# Patient Record
Sex: FEMALE | Race: WHITE | NOT HISPANIC OR LATINO | Employment: OTHER | URBAN - METROPOLITAN AREA
[De-identification: names, ages, dates, MRNs, and addresses within clinical notes are randomized per-mention and may not be internally consistent; named-entity substitution may affect disease eponyms.]

---

## 2018-05-14 ENCOUNTER — CONVERSION ENCOUNTER (OUTPATIENT)
Dept: OTHER | Facility: HOSPITAL | Age: 58
End: 2018-05-14

## 2018-06-14 ENCOUNTER — OFFICE VISIT CONVERTED (OUTPATIENT)
Dept: OTHER | Facility: HOSPITAL | Age: 58
End: 2018-06-14
Attending: NURSE PRACTITIONER

## 2018-06-14 ENCOUNTER — CONVERSION ENCOUNTER (OUTPATIENT)
Dept: OTHER | Facility: HOSPITAL | Age: 58
End: 2018-06-14

## 2018-08-01 ENCOUNTER — OFFICE VISIT CONVERTED (OUTPATIENT)
Dept: OTHER | Facility: HOSPITAL | Age: 58
End: 2018-08-01
Attending: NURSE PRACTITIONER

## 2018-09-04 ENCOUNTER — OFFICE VISIT CONVERTED (OUTPATIENT)
Dept: OTHER | Facility: HOSPITAL | Age: 58
End: 2018-09-04
Attending: NURSE PRACTITIONER

## 2018-11-01 ENCOUNTER — OFFICE VISIT CONVERTED (OUTPATIENT)
Dept: OTHER | Facility: HOSPITAL | Age: 58
End: 2018-11-01
Attending: NURSE PRACTITIONER

## 2018-11-01 ENCOUNTER — CONVERSION ENCOUNTER (OUTPATIENT)
Dept: OTHER | Facility: HOSPITAL | Age: 58
End: 2018-11-01

## 2019-01-31 ENCOUNTER — OFFICE VISIT CONVERTED (OUTPATIENT)
Dept: OTHER | Facility: HOSPITAL | Age: 59
End: 2019-01-31
Attending: NURSE PRACTITIONER

## 2019-04-30 ENCOUNTER — CONVERSION ENCOUNTER (OUTPATIENT)
Dept: OTHER | Facility: HOSPITAL | Age: 59
End: 2019-04-30

## 2019-04-30 ENCOUNTER — OFFICE VISIT CONVERTED (OUTPATIENT)
Dept: OTHER | Facility: HOSPITAL | Age: 59
End: 2019-04-30
Attending: NURSE PRACTITIONER

## 2019-04-30 ENCOUNTER — HOSPITAL ENCOUNTER (OUTPATIENT)
Dept: OTHER | Facility: HOSPITAL | Age: 59
Discharge: HOME | End: 2019-04-30
Attending: NURSE PRACTITIONER

## 2019-04-30 LAB
ALBUMIN SERPL-MCNC: 3.7 G/DL (ref 3.5–5)
ALBUMIN/GLOB SERPL: 0.9 {RATIO} (ref 1.4–2.6)
ALP SERPL-CCNC: 128 U/L (ref 53–141)
ALT SERPL-CCNC: 16 U/L (ref 10–40)
ANION GAP SERPL CALC-SCNC: 20 MMOL/L (ref 8–19)
AST SERPL-CCNC: 31 U/L (ref 15–50)
BASOPHILS # BLD AUTO: 0.07 10*3/UL (ref 0–0.2)
BASOPHILS NFR BLD AUTO: 0.5 % (ref 0–3)
BILIRUB SERPL-MCNC: 0.29 MG/DL (ref 0.2–1.3)
BUN SERPL-MCNC: 7 MG/DL (ref 5–25)
BUN/CREAT SERPL: 8 {RATIO} (ref 6–20)
CALCIUM SERPL-MCNC: 8.9 MG/DL (ref 8.7–10.4)
CHLORIDE SERPL-SCNC: 98 MMOL/L (ref 99–111)
CONV ABS IMM GRAN: 0.05 10*3/UL (ref 0–0.2)
CONV CO2: 22 MMOL/L (ref 22–32)
CONV IMMATURE GRAN: 0.4 % (ref 0–1.8)
CONV TOTAL PROTEIN: 7.6 G/DL (ref 6.3–8.2)
CREAT UR-MCNC: 0.89 MG/DL (ref 0.5–0.9)
DEPRECATED RDW RBC AUTO: 61.5 FL (ref 36.4–46.3)
EOSINOPHIL # BLD AUTO: 0.57 10*3/UL (ref 0–0.7)
EOSINOPHIL # BLD AUTO: 4.4 % (ref 0–7)
ERYTHROCYTE [DISTWIDTH] IN BLOOD BY AUTOMATED COUNT: 19.1 % (ref 11.7–14.4)
GFR SERPLBLD BASED ON 1.73 SQ M-ARVRAT: >60 ML/MIN/{1.73_M2}
GLOBULIN UR ELPH-MCNC: 3.9 G/DL (ref 2–3.5)
GLUCOSE SERPL-MCNC: 94 MG/DL (ref 65–99)
HBA1C MFR BLD: 14.5 G/DL (ref 12–16)
HCT VFR BLD AUTO: 46.7 % (ref 37–47)
LYMPHOCYTES # BLD AUTO: 3.83 10*3/UL (ref 1–5)
MCH RBC QN AUTO: 27.8 PG (ref 27–31)
MCHC RBC AUTO-ENTMCNC: 31 G/DL (ref 33–37)
MCV RBC AUTO: 89.5 FL (ref 81–99)
MONOCYTES # BLD AUTO: 0.73 10*3/UL (ref 0.2–1.2)
MONOCYTES NFR BLD AUTO: 5.7 % (ref 3–10)
NEUTROPHILS # BLD AUTO: 7.65 10*3/UL (ref 2–8)
NEUTROPHILS NFR BLD AUTO: 59.3 % (ref 30–85)
NRBC CBCN: 0 % (ref 0–0.7)
OSMOLALITY SERPL CALC.SUM OF ELEC: 280 MOSM/KG (ref 273–304)
PLATELET # BLD AUTO: 440 10*3/UL (ref 130–400)
PMV BLD AUTO: 10.7 FL (ref 9.4–12.3)
POTASSIUM SERPL-SCNC: 4.3 MMOL/L (ref 3.5–5.3)
RBC # BLD AUTO: 5.22 10*6/UL (ref 4.2–5.4)
SODIUM SERPL-SCNC: 136 MMOL/L (ref 135–147)
VARIANT LYMPHS NFR BLD MANUAL: 29.7 % (ref 20–45)
WBC # BLD AUTO: 12.9 10*3/UL (ref 4.8–10.8)

## 2019-05-14 ENCOUNTER — CONVERSION ENCOUNTER (OUTPATIENT)
Dept: OTHER | Facility: HOSPITAL | Age: 59
End: 2019-05-14

## 2019-05-14 ENCOUNTER — OFFICE VISIT CONVERTED (OUTPATIENT)
Dept: OTHER | Facility: HOSPITAL | Age: 59
End: 2019-05-14
Attending: NURSE PRACTITIONER

## 2019-07-11 ENCOUNTER — HOSPITAL ENCOUNTER (OUTPATIENT)
Dept: OTHER | Facility: HOSPITAL | Age: 59
Discharge: HOME | End: 2019-07-11
Attending: NURSE PRACTITIONER

## 2019-07-11 ENCOUNTER — OFFICE VISIT CONVERTED (OUTPATIENT)
Dept: OTHER | Facility: HOSPITAL | Age: 59
End: 2019-07-11
Attending: NURSE PRACTITIONER

## 2019-07-11 ENCOUNTER — CONVERSION ENCOUNTER (OUTPATIENT)
Dept: OTHER | Facility: HOSPITAL | Age: 59
End: 2019-07-11

## 2019-07-11 LAB
ALBUMIN SERPL-MCNC: 4 G/DL (ref 3.5–5)
ALBUMIN/GLOB SERPL: 1.1 {RATIO} (ref 1.4–2.6)
ALP SERPL-CCNC: 157 U/L (ref 53–141)
ALT SERPL-CCNC: 15 U/L (ref 10–40)
ANION GAP SERPL CALC-SCNC: 19 MMOL/L (ref 8–19)
AST SERPL-CCNC: 16 U/L (ref 15–50)
BASOPHILS # BLD AUTO: 0.05 10*3/UL (ref 0–0.2)
BASOPHILS NFR BLD AUTO: 0.4 % (ref 0–3)
BILIRUB SERPL-MCNC: 0.34 MG/DL (ref 0.2–1.3)
BUN SERPL-MCNC: 10 MG/DL (ref 5–25)
BUN/CREAT SERPL: 10 {RATIO} (ref 6–20)
CALCIUM SERPL-MCNC: 9.1 MG/DL (ref 8.7–10.4)
CHLORIDE SERPL-SCNC: 93 MMOL/L (ref 99–111)
CHOLEST SERPL-MCNC: 152 MG/DL (ref 107–200)
CHOLEST/HDLC SERPL: 3.6 {RATIO} (ref 3–6)
CONV ABS IMM GRAN: 0.06 10*3/UL (ref 0–0.2)
CONV CO2: 30 MMOL/L (ref 22–32)
CONV IMMATURE GRAN: 0.5 % (ref 0–1.8)
CONV TOTAL PROTEIN: 7.7 G/DL (ref 6.3–8.2)
CREAT UR-MCNC: 0.96 MG/DL (ref 0.5–0.9)
DEPRECATED RDW RBC AUTO: 47.8 FL (ref 36.4–46.3)
EOSINOPHIL # BLD AUTO: 0.23 10*3/UL (ref 0–0.7)
EOSINOPHIL # BLD AUTO: 1.8 % (ref 0–7)
ERYTHROCYTE [DISTWIDTH] IN BLOOD BY AUTOMATED COUNT: 14.8 % (ref 11.7–14.4)
FOLATE SERPL-MCNC: >20 NG/ML (ref 4.8–20)
GFR SERPLBLD BASED ON 1.73 SQ M-ARVRAT: >60 ML/MIN/{1.73_M2}
GLOBULIN UR ELPH-MCNC: 3.7 G/DL (ref 2–3.5)
GLUCOSE SERPL-MCNC: 126 MG/DL (ref 65–99)
HBA1C MFR BLD: 15.1 G/DL (ref 12–16)
HCT VFR BLD AUTO: 47.3 % (ref 37–47)
HDLC SERPL-MCNC: 42 MG/DL (ref 40–60)
LDLC SERPL CALC-MCNC: 80 MG/DL (ref 70–100)
LYMPHOCYTES # BLD AUTO: 3.88 10*3/UL (ref 1–5)
MCH RBC QN AUTO: 28.2 PG (ref 27–31)
MCHC RBC AUTO-ENTMCNC: 31.9 G/DL (ref 33–37)
MCV RBC AUTO: 88.2 FL (ref 81–99)
MONOCYTES # BLD AUTO: 0.57 10*3/UL (ref 0.2–1.2)
MONOCYTES NFR BLD AUTO: 4.4 % (ref 3–10)
NEUTROPHILS # BLD AUTO: 8.21 10*3/UL (ref 2–8)
NEUTROPHILS NFR BLD AUTO: 63.1 % (ref 30–85)
NRBC CBCN: 0 % (ref 0–0.7)
OSMOLALITY SERPL CALC.SUM OF ELEC: 287 MOSM/KG (ref 273–304)
PLATELET # BLD AUTO: 339 10*3/UL (ref 130–400)
PMV BLD AUTO: 10.8 FL (ref 9.4–12.3)
POTASSIUM SERPL-SCNC: 3.5 MMOL/L (ref 3.5–5.3)
RBC # BLD AUTO: 5.36 10*6/UL (ref 4.2–5.4)
SODIUM SERPL-SCNC: 138 MMOL/L (ref 135–147)
T4 FREE SERPL-MCNC: 1.4 NG/DL (ref 0.9–1.8)
TRIGL SERPL-MCNC: 151 MG/DL (ref 40–150)
TSH SERPL-ACNC: 0.25 M[IU]/L (ref 0.27–4.2)
VARIANT LYMPHS NFR BLD MANUAL: 29.8 % (ref 20–45)
VIT B12 SERPL-MCNC: 160 PG/ML (ref 211–911)
VLDLC SERPL-MCNC: 30 MG/DL (ref 5–37)
WBC # BLD AUTO: 13 10*3/UL (ref 4.8–10.8)

## 2019-10-14 ENCOUNTER — CONVERSION ENCOUNTER (OUTPATIENT)
Dept: OTHER | Facility: HOSPITAL | Age: 59
End: 2019-10-14

## 2019-10-14 ENCOUNTER — HOSPITAL ENCOUNTER (OUTPATIENT)
Dept: OTHER | Facility: HOSPITAL | Age: 59
Discharge: HOME | End: 2019-10-14
Attending: NURSE PRACTITIONER

## 2019-10-14 ENCOUNTER — OFFICE VISIT CONVERTED (OUTPATIENT)
Dept: OTHER | Facility: HOSPITAL | Age: 59
End: 2019-10-14
Attending: NURSE PRACTITIONER

## 2019-10-14 LAB
ALBUMIN SERPL-MCNC: 3.4 G/DL (ref 3.5–5)
ALBUMIN/GLOB SERPL: 1.1 {RATIO} (ref 1.4–2.6)
ALP SERPL-CCNC: 119 U/L (ref 43–160)
ALT SERPL-CCNC: <5 U/L (ref 10–40)
ANION GAP SERPL CALC-SCNC: 20 MMOL/L (ref 8–19)
AST SERPL-CCNC: 10 U/L (ref 15–50)
BILIRUB SERPL-MCNC: 0.32 MG/DL (ref 0.2–1.3)
BUN SERPL-MCNC: 9 MG/DL (ref 5–25)
BUN/CREAT SERPL: 11 {RATIO} (ref 6–20)
CALCIUM SERPL-MCNC: 9.7 MG/DL (ref 8.7–10.4)
CHLORIDE SERPL-SCNC: 101 MMOL/L (ref 99–111)
CONV CO2: 22 MMOL/L (ref 22–32)
CONV TOTAL PROTEIN: 6.5 G/DL (ref 6.3–8.2)
CREAT UR-MCNC: 0.84 MG/DL (ref 0.5–0.9)
GFR SERPLBLD BASED ON 1.73 SQ M-ARVRAT: >60 ML/MIN/{1.73_M2}
GLOBULIN UR ELPH-MCNC: 3.1 G/DL (ref 2–3.5)
GLUCOSE SERPL-MCNC: 90 MG/DL (ref 65–99)
MAGNESIUM SERPL-MCNC: 2.07 MG/DL (ref 1.6–2.3)
OSMOLALITY SERPL CALC.SUM OF ELEC: 286 MOSM/KG (ref 273–304)
POTASSIUM SERPL-SCNC: 4.3 MMOL/L (ref 3.5–5.3)
SODIUM SERPL-SCNC: 139 MMOL/L (ref 135–147)

## 2019-11-26 ENCOUNTER — OFFICE VISIT CONVERTED (OUTPATIENT)
Dept: OTHER | Facility: HOSPITAL | Age: 59
End: 2019-11-26
Attending: NURSE PRACTITIONER

## 2019-11-26 ENCOUNTER — CONVERSION ENCOUNTER (OUTPATIENT)
Dept: OTHER | Facility: HOSPITAL | Age: 59
End: 2019-11-26

## 2020-01-09 ENCOUNTER — CONVERSION ENCOUNTER (OUTPATIENT)
Dept: OTHER | Facility: HOSPITAL | Age: 60
End: 2020-01-09

## 2020-01-09 ENCOUNTER — OFFICE VISIT CONVERTED (OUTPATIENT)
Dept: OTHER | Facility: HOSPITAL | Age: 60
End: 2020-01-09
Attending: NURSE PRACTITIONER

## 2020-01-09 ENCOUNTER — HOSPITAL ENCOUNTER (OUTPATIENT)
Dept: OTHER | Facility: HOSPITAL | Age: 60
Discharge: HOME | End: 2020-01-09
Attending: NURSE PRACTITIONER

## 2020-01-09 LAB
ALBUMIN SERPL-MCNC: 3.5 G/DL (ref 3.5–5)
ALBUMIN/GLOB SERPL: 1.1 {RATIO} (ref 1.4–2.6)
ALP SERPL-CCNC: 125 U/L (ref 43–160)
ALT SERPL-CCNC: 10 U/L (ref 10–40)
ANION GAP SERPL CALC-SCNC: 18 MMOL/L (ref 8–19)
AST SERPL-CCNC: 18 U/L (ref 15–50)
BASOPHILS # BLD AUTO: 0.05 10*3/UL (ref 0–0.2)
BASOPHILS NFR BLD AUTO: 0.5 % (ref 0–3)
BILIRUB SERPL-MCNC: 0.23 MG/DL (ref 0.2–1.3)
BUN SERPL-MCNC: 12 MG/DL (ref 5–25)
BUN/CREAT SERPL: 14 {RATIO} (ref 6–20)
CALCIUM SERPL-MCNC: 9.2 MG/DL (ref 8.7–10.4)
CHLORIDE SERPL-SCNC: 104 MMOL/L (ref 99–111)
CHOLEST SERPL-MCNC: 175 MG/DL (ref 107–200)
CHOLEST/HDLC SERPL: 5.3 {RATIO} (ref 3–6)
CONV ABS IMM GRAN: 0.03 10*3/UL (ref 0–0.2)
CONV CO2: 21 MMOL/L (ref 22–32)
CONV IMMATURE GRAN: 0.3 % (ref 0–1.8)
CONV TOTAL PROTEIN: 6.6 G/DL (ref 6.3–8.2)
CREAT UR-MCNC: 0.84 MG/DL (ref 0.5–0.9)
DEPRECATED RDW RBC AUTO: 52.9 FL (ref 36.4–46.3)
EOSINOPHIL # BLD AUTO: 0.37 10*3/UL (ref 0–0.7)
EOSINOPHIL # BLD AUTO: 3.6 % (ref 0–7)
ERYTHROCYTE [DISTWIDTH] IN BLOOD BY AUTOMATED COUNT: 15.8 % (ref 11.7–14.4)
GFR SERPLBLD BASED ON 1.73 SQ M-ARVRAT: >60 ML/MIN/{1.73_M2}
GLOBULIN UR ELPH-MCNC: 3.1 G/DL (ref 2–3.5)
GLUCOSE SERPL-MCNC: 94 MG/DL (ref 65–99)
HCT VFR BLD AUTO: 44.5 % (ref 37–47)
HDLC SERPL-MCNC: 33 MG/DL (ref 40–60)
HGB BLD-MCNC: 13.6 G/DL (ref 12–16)
LDLC SERPL CALC-MCNC: 109 MG/DL (ref 70–100)
LYMPHOCYTES # BLD AUTO: 2.9 10*3/UL (ref 1–5)
LYMPHOCYTES NFR BLD AUTO: 27.9 % (ref 20–45)
MCH RBC QN AUTO: 28 PG (ref 27–31)
MCHC RBC AUTO-ENTMCNC: 30.6 G/DL (ref 33–37)
MCV RBC AUTO: 91.8 FL (ref 81–99)
MONOCYTES # BLD AUTO: 0.64 10*3/UL (ref 0.2–1.2)
MONOCYTES NFR BLD AUTO: 6.1 % (ref 3–10)
NEUTROPHILS # BLD AUTO: 6.42 10*3/UL (ref 2–8)
NEUTROPHILS NFR BLD AUTO: 61.6 % (ref 30–85)
NRBC CBCN: 0 % (ref 0–0.7)
OSMOLALITY SERPL CALC.SUM OF ELEC: 286 MOSM/KG (ref 273–304)
PLATELET # BLD AUTO: 321 10*3/UL (ref 130–400)
PMV BLD AUTO: 10.8 FL (ref 9.4–12.3)
POTASSIUM SERPL-SCNC: 4.8 MMOL/L (ref 3.5–5.3)
RBC # BLD AUTO: 4.85 10*6/UL (ref 4.2–5.4)
SODIUM SERPL-SCNC: 138 MMOL/L (ref 135–147)
TRIGL SERPL-MCNC: 167 MG/DL (ref 40–150)
TSH SERPL-ACNC: 1.81 M[IU]/L (ref 0.27–4.2)
VLDLC SERPL-MCNC: 33 MG/DL (ref 5–37)
WBC # BLD AUTO: 10.41 10*3/UL (ref 4.8–10.8)

## 2020-01-21 ENCOUNTER — OFFICE VISIT CONVERTED (OUTPATIENT)
Dept: OTHER | Facility: HOSPITAL | Age: 60
End: 2020-01-21
Attending: NURSE PRACTITIONER

## 2020-01-21 ENCOUNTER — CONVERSION ENCOUNTER (OUTPATIENT)
Dept: OTHER | Facility: HOSPITAL | Age: 60
End: 2020-01-21

## 2020-02-10 ENCOUNTER — OFFICE VISIT CONVERTED (OUTPATIENT)
Dept: OTHER | Facility: HOSPITAL | Age: 60
End: 2020-02-10
Attending: NURSE PRACTITIONER

## 2020-02-10 ENCOUNTER — CONVERSION ENCOUNTER (OUTPATIENT)
Dept: OTHER | Facility: HOSPITAL | Age: 60
End: 2020-02-10

## 2020-03-12 ENCOUNTER — OFFICE VISIT CONVERTED (OUTPATIENT)
Dept: OTHER | Facility: HOSPITAL | Age: 60
End: 2020-03-12
Attending: NURSE PRACTITIONER

## 2020-03-12 ENCOUNTER — CONVERSION ENCOUNTER (OUTPATIENT)
Dept: OTHER | Facility: HOSPITAL | Age: 60
End: 2020-03-12

## 2020-04-22 ENCOUNTER — TELEMEDICINE CONVERTED (OUTPATIENT)
Dept: OTHER | Facility: HOSPITAL | Age: 60
End: 2020-04-22
Attending: NURSE PRACTITIONER

## 2020-05-07 ENCOUNTER — TELEMEDICINE CONVERTED (OUTPATIENT)
Dept: OTHER | Facility: HOSPITAL | Age: 60
End: 2020-05-07
Attending: NURSE PRACTITIONER

## 2020-06-29 ENCOUNTER — CONVERSION ENCOUNTER (OUTPATIENT)
Dept: OTHER | Facility: HOSPITAL | Age: 60
End: 2020-06-29

## 2020-06-29 ENCOUNTER — HOSPITAL ENCOUNTER (OUTPATIENT)
Dept: OTHER | Facility: HOSPITAL | Age: 60
Discharge: HOME | End: 2020-06-29
Attending: NURSE PRACTITIONER

## 2020-06-29 ENCOUNTER — OFFICE VISIT CONVERTED (OUTPATIENT)
Dept: OTHER | Facility: HOSPITAL | Age: 60
End: 2020-06-29
Attending: NURSE PRACTITIONER

## 2020-06-30 LAB
ALBUMIN SERPL-MCNC: 4 G/DL (ref 3.5–5)
ALBUMIN/GLOB SERPL: 1.3 {RATIO} (ref 1.4–2.6)
ALP SERPL-CCNC: 5 U/L (ref 43–160)
ALT SERPL-CCNC: 13 U/L (ref 10–40)
ANION GAP SERPL CALC-SCNC: 18 MMOL/L (ref 8–19)
AST SERPL-CCNC: 21 U/L (ref 15–50)
BASOPHILS # BLD AUTO: 0.04 10*3/UL (ref 0–0.2)
BASOPHILS NFR BLD AUTO: 0.3 % (ref 0–3)
BILIRUB SERPL-MCNC: 0.21 MG/DL (ref 0.2–1.3)
BUN SERPL-MCNC: 13 MG/DL (ref 5–25)
BUN/CREAT SERPL: 16 {RATIO} (ref 6–20)
CALCIUM SERPL-MCNC: 9.2 MG/DL (ref 8.7–10.4)
CHLORIDE SERPL-SCNC: 102 MMOL/L (ref 99–111)
CHOLEST SERPL-MCNC: 198 MG/DL (ref 107–200)
CHOLEST/HDLC SERPL: 5.1 {RATIO} (ref 3–6)
CONV ABS IMM GRAN: 0.08 10*3/UL (ref 0–0.2)
CONV CO2: 24 MMOL/L (ref 22–32)
CONV IMMATURE GRAN: 0.5 % (ref 0–1.8)
CONV TOTAL PROTEIN: 7.2 G/DL (ref 6.3–8.2)
CREAT UR-MCNC: 0.81 MG/DL (ref 0.5–0.9)
DEPRECATED RDW RBC AUTO: 53.1 FL (ref 36.4–46.3)
DIGOXIN SERPL-MCNC: 0.8 NG/ML (ref 0.5–2)
EOSINOPHIL # BLD AUTO: 0.41 10*3/UL (ref 0–0.7)
EOSINOPHIL # BLD AUTO: 2.8 % (ref 0–7)
ERYTHROCYTE [DISTWIDTH] IN BLOOD BY AUTOMATED COUNT: 14.9 % (ref 11.7–14.4)
GFR SERPLBLD BASED ON 1.73 SQ M-ARVRAT: >60 ML/MIN/{1.73_M2}
GLOBULIN UR ELPH-MCNC: 3.2 G/DL (ref 2–3.5)
GLUCOSE SERPL-MCNC: 133 MG/DL (ref 65–99)
HCT VFR BLD AUTO: 50 % (ref 37–47)
HDLC SERPL-MCNC: 39 MG/DL (ref 40–60)
HGB BLD-MCNC: 15.2 G/DL (ref 12–16)
LDLC SERPL CALC-MCNC: 123 MG/DL (ref 70–100)
LYMPHOCYTES # BLD AUTO: 4.84 10*3/UL (ref 1–5)
LYMPHOCYTES NFR BLD AUTO: 33 % (ref 20–45)
MCH RBC QN AUTO: 29.1 PG (ref 27–31)
MCHC RBC AUTO-ENTMCNC: 30.4 G/DL (ref 33–37)
MCV RBC AUTO: 95.8 FL (ref 81–99)
MONOCYTES # BLD AUTO: 0.7 10*3/UL (ref 0.2–1.2)
MONOCYTES NFR BLD AUTO: 4.8 % (ref 3–10)
NEUTROPHILS # BLD AUTO: 8.59 10*3/UL (ref 2–8)
NEUTROPHILS NFR BLD AUTO: 58.6 % (ref 30–85)
NRBC CBCN: 0 % (ref 0–0.7)
OSMOLALITY SERPL CALC.SUM OF ELEC: 290 MOSM/KG (ref 273–304)
PLATELET # BLD AUTO: 293 10*3/UL (ref 130–400)
PMV BLD AUTO: 10.6 FL (ref 9.4–12.3)
POTASSIUM SERPL-SCNC: 4.8 MMOL/L (ref 3.5–5.3)
RBC # BLD AUTO: 5.22 10*6/UL (ref 4.2–5.4)
SODIUM SERPL-SCNC: 139 MMOL/L (ref 135–147)
T4 FREE SERPL-MCNC: 1.1 NG/DL (ref 0.9–1.8)
THEOPHYLLINE SERPL-MCNC: 9.6 UG/ML (ref 10–20)
TRIGL SERPL-MCNC: 179 MG/DL (ref 40–150)
TSH SERPL-ACNC: 2.28 M[IU]/L (ref 0.27–4.2)
VLDLC SERPL-MCNC: 36 MG/DL (ref 5–37)
WBC # BLD AUTO: 14.66 10*3/UL (ref 4.8–10.8)

## 2020-07-01 LAB
CONV HEPATITIS C AB WITH REFLEX TO CONFIRMATION: <0.1 S/CO RATIO (ref 0–0.9)
CONV HEPATITIS COMMENT: NORMAL
EST. AVERAGE GLUCOSE BLD GHB EST-MCNC: 123 MG/DL
HBA1C MFR BLD: 5.9 % (ref 3.5–5.7)

## 2021-05-12 NOTE — PROGRESS NOTES
Progress Note      Patient Name: Valerie Valente   Patient ID: [id number]   Sex: Female   YOB: 1960    Primary Care Provider: Moriah BALDWIN   Referring Provider: Moriah BALDWIN    Visit Date: April 22, 2020    Provider: DENNY Gonzalez   Location: [facility]   Location Address: [address] [place] [address]  [id number]   Location Phone: [phone]          Chief Complaint  · Cough   · Congestion   · Runny Nose   · Body aches   · Chills  · Swelling and joint pain      History Of Present Illness  Valerie Valente is a 59 year old /White female who presents for evaluation and treatment of:   Video Conferencing Visit  Valerie Valente is a 59 year old /White female who is presenting for evaluation via video conferencing. Verbal consent obtained before beginning visit.   The following staff were present during this visit: Stefany Yoder CMA, Moriah BALDWIN      Patient with complaint of cough, congestion, runny nose does admit to body aches and chills denies fever states she does not have a thermometer to check.  Also with complaints of swelling and joint pain. Patient states the symptoms have been ongoing for the last week, she denies any shortness of breath.  She has not taken any over-the-counter medicines she has continued to take her routine albuterol and Flovent.           Past Medical History  Disease Name Date Onset Notes   Altered mental status --  --    Anxiety disorder --  --    CHF (congestive heart failure) --  --    Chronic low back pain --  --    CKD (chronic kidney disease) --  --    COPD (chronic obstructive pulmonary disease) 09/04/2018 --    Depression --  --    Hip pain, bilateral --  --    Hypotension --  --    Hypoxia --  --    Leukocytosis --  --    Lumbar degenerative disc disease --  --    PAF (paroxysmal atrial fibrillation) --  --    Peripheral neuropathy --  --    Pneumonia --  --    Sepsis due to Escherichia coli --   --    Thyroiditis --  --    TIA (transient ischemic attack) --  --          Past Surgical History  Procedure Name Date Notes   Cholecstectomy --  --    Exploratory --  --    Tonsilectomy --  --    Tubal ligation --  --          Medication List  Name Date Started Instructions   albuterol sulfate 0.63 mg/3 mL inhalation solution for nebulization  use in nebulizer as directed   cetirizine 10 mg oral tablet 11/26/2019 take 1 tablet (10 mg) by oral route once daily for 30 days   cyanocobalamin (vitamin B-12) 1,000 mcg/mL injection solution 02/10/2020 inject 1 milliliter (1,000 mcg) by intramuscular route once a month for 30 days   digoxin 125 mcg (0.125 mg) oral tablet 02/10/2020 Take 1 tablet by mouth once daily for 90 days   Flovent  mcg/actuation inhalation HFA aerosol inhaler 01/09/2020 inhale 2 puffs (220 mcg) by inhalation route 2 times per day for 30 days   fluoxetine 20 mg oral capsule 01/07/2020 Take 3 capsules by mouth every morning   folic acid 1 mg oral tablet 01/09/2020 Take 1 tablet by mouth once daily   gabapentin 600 mg oral tablet  take 1 tablet (600 mg) by oral route 3 times per day   hydrocodone-acetaminophen 5-325 mg oral tablet  take 1 tablet by oral route every 6 hours as needed for pain   Imodium A-D 2 mg oral tablet 08/15/2019 1 q 6hrs prin   isosorbide mononitrate 30 mg oral tablet extended release 24 hr 10/10/2019 take 1 tablet (30 mg) by oral route once daily in the morning for 30 days   Lasix 40 mg oral tablet 01/09/2020 Take 1 tablet by mouth every day   levothyroxine 75 mcg oral tablet 01/09/2020 Take 1 tablet by mouth once daily   Mag 64 64 mg oral tablet,delayed release (DR/EC) 01/09/2020 Take 1 tablet by mouth once daily   midodrine 10 mg oral tablet 01/09/2020 Take 1 tablet by mouth three times a day to control drops in blood pressure   nitroglycerin 0.4 mg sublingual tablet, sublingual 01/09/2020 place 1 tablet at the first sign of an attack; no more than 3 tabs are recommended  within a 15 minute period.   omeprazole 20 mg oral capsule,delayed release(DR/EC)  take 1 capsule (20 mg) by oral route once daily before a meal   potassium chloride 20 mEq oral tablet extended release 01/09/2020 take 1 tablet (20 meq) by oral route once daily with food for 30 days   pravastatin 80 mg oral tablet 01/09/2020 take 1 tablet (80 mg) by oral route once daily at bedtime for 90 days   ProAir HFA 90 mcg/actuation inhalation HFA aerosol inhaler 01/09/2020 inhale 1 puff (90 mcg) by inhalation route every 6 hours as needed for 30 days   Singulair 10 mg oral tablet 02/10/2020 take 1 tablet (10 mg) by oral route once daily in the evening for 30 days   theophylline 300 mg oral tablet extended release 12 hr 01/09/2020 Take 1 tablet by mouth three times a day   Xanax 1 mg oral tablet 04/13/2020 take 1 tablet (1 mg) by oral route 3 times per day for 30 days   Zofran 8 mg oral tablet 01/09/2020 take 1 tablet by oral route 3 times a day for 15 days         Allergy List  Allergen Name Date Reaction Notes   Ceclor 2- rash --    Cipro --  nausea and yeast infection --    I.V. Dye 8- --  Had CT of Head   Keflex --  --  --    Terramycin --  red, swelling --        Allergies Reconciled  Family Medical History  Disease Name Relative/Age Notes   Respiratory Disorder  Respiratory failure   Heart Disease  --    Lung cancer  --          Social History  Finding Status Start/Stop Quantity Notes   Alcohol Never --/-- --  --    Denies illicit substance abuse --  --/-- --  --    Denies substance abuse --  --/-- --  --    Tobacco Current every day --/-- 9-12 cigs qd --          Immunizations  NameDate Admin Mfg Trade Name Lot Number Route Inj VIS Given VIS Publication   Dnwqxcbrh00/14/2019 PMC Fluzone Quadrivalent [id number] IM LD 10/14/2019    Comments:    Kykujcwtc14/01/2018 SKB Fluzone Quadrivalent [id number] IM LD 11/01/2018 08/07/2015   Comments:    Cltauytxh59/01/2017 UNK Unknown TradeName  IM UKN     Comments:     Prevnar 1311/02/2015 UNK Unknown TradeName  IM UKN     Comments:          Review of Systems  · Constitutional  o Admits  o : chills, fatigue, weakness  o Denies  o : fever  · HENT  o Admits  o : nasal congestion, postnasal drip  o Denies  o : sinus pain, neck pain, tinnitus, ear pain, ear fullness  · Cardiovascular  o Denies  o : lower extremity edema, pain in chest, palpitations  · Respiratory  o Admits  o : cough, sputum  o Denies  o : shortness of breath  · Gastrointestinal  o Denies  o : diarrhea, constipation  · Musculoskeletal  o Admits  o : arthralgias , myalgias, muscle weakness      Physical Examination  · Constitutional  o Appearance  o : well-nourished, well developed, alert, in no acute distress, well-tended appearance  · Respiratory  o Respiratory Effort  o : breathing comfortably, symmetric chest rise  · Neurologic  o Mental Status Examination  o :   § Orientation  § : grossly oriented to person, place and time  · Psychiatric  o General  o : normal mood and affect          Assessment  · COPD (chronic obstructive pulmonary disease)     496/J44.9  · Cough     786.2/R05  · Upper respiratory infection     465.9/J06.9    Problems Reconciled  Plan  · Orders  o ACO-39: Current medications updated and reviewed () - - 04/22/2020  o ACO-15: Pneumococcal Vaccine Administered or Previously Received (4040F) - - 04/22/2020  o ACO-14: Influenza immunization administered or previously received () - - 04/22/2020  · Medications  o amoxicillin 500 mg oral capsule   SIG: take 1 capsule (500 mg) by oral route every 12 hours for 7 days   DISP: (14) capsules with 0 refills  Prescribed on 04/22/2020     · Instructions  o Take all medications as prescribed/directed.  o Rest. Increase Fluids.  o Patient was educated/instructed on their diagnosis, treatment and medications prior to discharge from the clinic today.  o Patient instructed to seek medical attention urgently for new or worsening symptoms.  o Call the  office with any concerns or questions.  o Discussed Covid-19 precautions including, but not limited to, social distancing, avoid touching your face, and hand washing.   o Advised patient that she needs to monitor her symptoms, she needs to get a thermometer if she does not find when she can stop by the office and we will try and get her 1. If her symptoms worsen or they do not improve she needs to follow-up in the office or in the ER. We will send her in amoxicillin twice daily for 7 days, advised patient not to start it unless she starts to feel worse that this is likely a viral illness but if it continues she may need to start the amoxicillin. Patient and daughter voiced understanding.  · Disposition  o Follow Up PRN            Electronically Signed by: DENNY Gonzalez -Author on April 22, 2020 12:12:35 PM

## 2021-05-13 NOTE — PROGRESS NOTES
Progress Note      Patient Name: Valerie Valente   Patient ID: [id number]   Sex: Female   YOB: 1960    Primary Care Provider: Moriah BALDWIN   Referring Provider: Moriah BALDWIN    Visit Date: May 7, 2020    Provider: DENNY Gonzalez   Location: [facility]   Location Address: [address] [place] [address]  [id number]   Location Phone: [phone]          Chief Complaint  · Follow up   · Allergy Complaints      History Of Present Illness  Valerie Valente is a 59 year old /White female who presents for evaluation and treatment of:   Video Conferencing Visit  Valerie Valente is a 59 year old /White female who is presenting for evaluation via video conferencing. Verbal consent obtained before beginning visit.   The following staff were present during this visit: Stefany Yoder CMA, Moriah BALDWIN      Follow-up anxiety, COPD, and allergic rhinitis.  Patient is currently on alprazolam as needed for anxiety tolerates medication well and denies any side effects.  We have tried to wean her down on this in the past and it did not work out great she became short of breath tachycardic.  Nathaniel and urine drug screen are both up-to-date.    Patient was treated at last visit on April 22 for upper respiratory infection COPD exacerbation she was treated with amoxicillin and told to continue her routine medications. Continues to complain of itchy watery eyes. Some head congestion and chest congestion, with a cough. Overall feeling better. She is on routine Zyrtec and Singulair.       Past Medical History  Disease Name Date Onset Notes   Altered mental status --  --    Anxiety disorder --  --    CHF (congestive heart failure) --  --    Chronic low back pain --  --    CKD (chronic kidney disease) --  --    COPD (chronic obstructive pulmonary disease) 09/04/2018 --    Depression --  --    Hip pain, bilateral --  --    Hypotension --  --    Hypoxia --  --     Leukocytosis --  --    Lumbar degenerative disc disease --  --    PAF (paroxysmal atrial fibrillation) --  --    Peripheral neuropathy --  --    Pneumonia --  --    Sepsis due to Escherichia coli --  --    Thyroiditis --  --    TIA (transient ischemic attack) --  --          Past Surgical History  Procedure Name Date Notes   Cholecstectomy --  --    Exploratory --  --    Tonsilectomy --  --    Tubal ligation --  --          Medication List  Name Date Started Instructions   albuterol sulfate 0.63 mg/3 mL inhalation solution for nebulization  use in nebulizer as directed   cetirizine 10 mg oral tablet 11/26/2019 take 1 tablet (10 mg) by oral route once daily for 30 days   cyanocobalamin (vitamin B-12) 1,000 mcg/mL injection solution 02/10/2020 inject 1 milliliter (1,000 mcg) by intramuscular route once a month for 30 days   digoxin 125 mcg (0.125 mg) oral tablet 02/10/2020 Take 1 tablet by mouth once daily for 90 days   Flovent  mcg/actuation inhalation HFA aerosol inhaler 01/09/2020 inhale 2 puffs (220 mcg) by inhalation route 2 times per day for 30 days   fluoxetine 20 mg oral capsule 01/07/2020 Take 3 capsules by mouth every morning   folic acid 1 mg oral tablet 01/09/2020 Take 1 tablet by mouth once daily   gabapentin 600 mg oral tablet  take 1 tablet (600 mg) by oral route 3 times per day   hydrocodone-acetaminophen 5-325 mg oral tablet  take 1 tablet by oral route every 6 hours as needed for pain   Imodium A-D 2 mg oral tablet 08/15/2019 1 q 6hrs prin   isosorbide mononitrate 30 mg oral tablet extended release 24 hr 10/10/2019 take 1 tablet (30 mg) by oral route once daily in the morning for 30 days   Lasix 40 mg oral tablet 01/09/2020 Take 1 tablet by mouth every day   levothyroxine 75 mcg oral tablet 01/09/2020 Take 1 tablet by mouth once daily   Mag 64 64 mg oral tablet,delayed release (DR/EC) 01/09/2020 Take 1 tablet by mouth once daily   midodrine 10 mg oral tablet 01/09/2020 Take 1 tablet by mouth  three times a day to control drops in blood pressure   nitroglycerin 0.4 mg sublingual tablet, sublingual 01/09/2020 place 1 tablet at the first sign of an attack; no more than 3 tabs are recommended within a 15 minute period.   omeprazole 20 mg oral capsule,delayed release(DR/EC)  take 1 capsule (20 mg) by oral route once daily before a meal   potassium chloride 20 mEq oral tablet extended release 01/09/2020 take 1 tablet (20 meq) by oral route once daily with food for 30 days   pravastatin 80 mg oral tablet 01/09/2020 take 1 tablet (80 mg) by oral route once daily at bedtime for 90 days   ProAir HFA 90 mcg/actuation inhalation HFA aerosol inhaler 01/09/2020 inhale 1 puff (90 mcg) by inhalation route every 6 hours as needed for 30 days   Singulair 10 mg oral tablet 02/10/2020 take 1 tablet (10 mg) by oral route once daily in the evening for 30 days   theophylline 300 mg oral tablet extended release 12 hr 01/09/2020 Take 1 tablet by mouth three times a day   Xanax 1 mg oral tablet 05/07/2020 take 1 tablet (1 mg) by oral route 3 times per day for 30 days   Zofran 8 mg oral tablet 01/09/2020 take 1 tablet by oral route 3 times a day for 15 days         Allergy List  Allergen Name Date Reaction Notes   Christy 2- rash --    Cipro --  nausea and yeast infection --    I.V. Dye 8- --  Had CT of Head   Keflex --  --  --    Terramycin --  red, swelling --        Allergies Reconciled  Family Medical History  Disease Name Relative/Age Notes   Respiratory Disorder  Respiratory failure   Heart Disease  --    Lung cancer  --          Social History  Finding Status Start/Stop Quantity Notes   Alcohol Never --/-- --  --    Denies illicit substance abuse --  --/-- --  --    Denies substance abuse --  --/-- --  --    Tobacco Current every day --/-- 9-12 cigs qd --          Immunizations  NameDate Admin Mfg Trade Name Lot Number Route Inj VIS Given VIS Publication   Pkyrihjyp42/14/2019 [facility] Fluzone Quadrivalent [id number]  IM LD 10/14/2019    Comments:    Xgngwgqms45/01/2018 SKB Fluzone Quadrivalent [id number] IM LD 11/01/2018 08/07/2015   Comments:    Qpvtyontl62/01/2017 UNK Unknown TradeName  IM UKN     Comments:    Prevnar 1311/02/2015 UNK Unknown TradeName  IM UKN     Comments:          Review of Systems  · Constitutional  o Denies  o : fever, chills, fatigue, weakness  · HENT  o Denies  o : sore throat  · Cardiovascular  o Denies  o : lower extremity edema, pain in neck , palpitations  · Respiratory  o Admits  o : shortness of breath, cough  · Gastrointestinal  o Denies  o : diarrhea, constipation  · Genitourinary  o Denies  o : urgency, frequency  · Allergic-Immunologic  o Admits  o : sinus allergy symptoms      Physical Examination  · Constitutional  o Appearance  o : well-nourished, well developed, alert, in no acute distress, well-tended appearance  · Respiratory  o Respiratory Effort  o : breathing comfortably  · Neurologic  o Mental Status Examination  o :   § Orientation  § : grossly oriented to person, place and time  · Psychiatric  o General  o : normal mood and affect          Assessment  · Allergic rhinitis due to allergen     477.9/J30.9  · Anxiety disorder     300.00/F41.9  · COPD (chronic obstructive pulmonary disease)     496/J44.9    Problems Reconciled  Plan  · Orders  o OPHELIA Report (KASPR) - 300.00/F41.9 - 05/07/2020  o ACO-39: Current medications updated and reviewed () - - 05/07/2020  o ACO-14: Influenza immunization administered or previously received () - - 05/07/2020  · Medications  o fluticasone propionate 50 mcg/actuation nasal spray,suspension   SIG: spray 1 - 2 sprays in each nostril by intranasal route once daily   DISP: (1) 9.9 ml aer w/adap with 2 refills  Prescribed on 05/07/2020     o Xanax 1 mg oral tablet   SIG: take 1 tablet (1 mg) by oral route 3 times per day for 30 days   DISP: (90) tablet with 0 refills  Refilled on 05/07/2020     o amoxicillin 500 mg oral capsule   SIG: take 1  capsule (500 mg) by oral route every 12 hours for 7 days   DISP: (14) capsules with 0 refills  Discontinued on 05/07/2020     · Instructions  o Patient is taking medications as prescribed and doing well.   o Patient was educated/instructed on their diagnosis, treatment and medications prior to discharge from the clinic today.  o Patient instructed to seek medical attention urgently for new or worsening symptoms.  o Call the office with any concerns or questions.  o Discussed Covid-19 precautions including, but not limited to, social distancing, avoid touching your face, and hand washing.   o We will add Flonase to the patient's allergy regimen, if symptoms do not improve or she worsens encouraged to follow-up in the office and we will consider an allergy referral. Patient voiced understanding.  o Controlled substance agreement has been signed. Urine drug screen and Nathaniel reports have been reviewed and there are no discrepancies. Patient has denied side effect of medications. Patient advised to keep medications in original prescription bottle. Patient is to keep medications in a locked area away from children. Advised not to drive or operate heavy machinery when taking medications.  · Disposition  o Follow Up PRN     We will keep Valerie scheduled appointment for Laura 15, if her symptoms worsen she will become increasingly short of breath fever chills advised her to follow-up in emergency department patient voiced understanding and all questions answered.    EMR dragon/transcription disclaimer: Much of this encounter note is an electronic transcription/translation of spoken language to printed text.  Electronic translation of spoken language may permit erroneous, or at times nonsensical words or phrases to be inadvertently transcribed; although I have reviewed the note for such errors, some may still exist.             Electronically Signed by: Moriah Mendez APRN -Author on May 7, 2020 11:32:26 AM

## 2021-05-13 NOTE — PROGRESS NOTES
Progress Note      Patient Name: Valerie Valente   Patient ID: [id number]   Sex: Female   YOB: 1960    Primary Care Provider: Moriah BALDWIN   Referring Provider: Moriah BALDWIN    Visit Date: June 29, 2020    Provider: DENNY Gonzalez   Location: [facility]   Location Address: [address] [place] [address]  [id number]   Location Phone: [phone]          Chief Complaint  · Follow up      History Of Present Illness  Valerie Valente is a 59 year old /White female who presents for evaluation and treatment of:      Follow up on Allergies, Anxiety/Depression, Hypotension. Patient is currently on digoxin, and furosemide for congestive heart failure which is under good control.  She is on levothyroxine for hypothyroidism.  Patient is using Midodrine for hypotension, we have slowly been weaning this medication down as it does cause chest discomfort.  Patient does have a history of COPD she is currently on albuterol, Flovent, and theophylline.  Breathing is under good control at this time. States that she does have a productive cough at times, with clear sputum. She is on fluoxetine and alprazolam for depression and anxiety, she has been on this medication for some time she will typically take alprazolam up to 3 times a day.  We have tried weaning her in the past and was unsuccessful.  Nathaniel and urine drug screen are both up-to-date.    Needing medication refills and routine labs.    Not taking Mag-64 causes nausea.       Past Medical History  Disease Name Date Onset Notes   Altered mental status --  --    Anxiety disorder --  --    CHF (congestive heart failure) --  --    Chronic low back pain --  --    CKD (chronic kidney disease) --  --    COPD (chronic obstructive pulmonary disease) 09/04/2018 --    Depression --  --    Hip pain, bilateral --  --    Hypotension --  --    Hypoxia --  --    Leukocytosis --  --    Lumbar degenerative disc disease --  --    PAF  (paroxysmal atrial fibrillation) --  --    Peripheral neuropathy --  --    Pneumonia --  --    Sepsis due to Escherichia coli --  --    Thyroiditis --  --    TIA (transient ischemic attack) --  --          Past Surgical History  Procedure Name Date Notes   Cholecstectomy --  --    Exploratory --  --    Tonsilectomy --  --    Tubal ligation --  --          Medication List  Name Date Started Instructions   albuterol sulfate 0.63 mg/3 mL inhalation solution for nebulization  use in nebulizer as directed   cetirizine 10 mg oral tablet 06/29/2020 take 1 tablet (10 mg) by oral route once daily for 30 days   cyanocobalamin (vitamin B-12) 1,000 mcg/mL injection solution 06/29/2020 inject 1 milliliter (1,000 mcg) by intramuscular route once a month for 30 days   digoxin 125 mcg (0.125 mg) oral tablet 05/12/2020 Take 1 tablet by mouth once daily   Flovent  mcg/actuation inhalation HFA aerosol inhaler 06/29/2020 inhale 2 puffs (220 mcg) by inhalation route 2 times per day for 30 days   fluoxetine 20 mg oral capsule 06/08/2020 Take 3 capsules by mouth every morning   fluticasone propionate 50 mcg/actuation nasal spray,suspension 05/07/2020 spray 1 - 2 sprays in each nostril by intranasal route once daily   folic acid 1 mg oral tablet 06/29/2020 Take 1 tablet by mouth once daily   gabapentin 600 mg oral tablet  take 1 tablet (600 mg) by oral route 3 times per day   hydrocodone-acetaminophen 5-325 mg oral tablet  take 1 tablet by oral route every 6 hours as needed for pain   Imodium A-D 2 mg oral tablet 08/15/2019 1 q 6hrs prin   isosorbide mononitrate 30 mg oral tablet extended release 24 hr 06/15/2020 Take 1 tablet by mouth once daily in the morning   Lasix 40 mg oral tablet 01/09/2020 Take 1 tablet by mouth every day   levothyroxine 75 mcg oral tablet 06/29/2020 Take 1 tablet by mouth once daily   midodrine 10 mg oral tablet 05/14/2020 Take 1 tablet by mouth three times a day to control drops in blood pressure    nitroglycerin 0.4 mg sublingual tablet, sublingual 01/09/2020 place 1 tablet at the first sign of an attack; no more than 3 tabs are recommended within a 15 minute period.   omeprazole 20 mg oral capsule,delayed release(DR/EC)  take 1 capsule (20 mg) by oral route once daily before a meal   potassium chloride 20 mEq oral tablet extended release 06/29/2020 take 1 tablet (20 meq) by oral route once daily with food for 30 days   pravastatin 80 mg oral tablet 06/29/2020 take 1 tablet (80 mg) by oral route once daily at bedtime for 90 days   ProAir HFA 90 mcg/actuation inhalation HFA aerosol inhaler 06/29/2020 inhale 1 puff (90 mcg) by inhalation route every 6 hours as needed for 30 days   promethazine 25 mg oral tablet 06/29/2020 take 1 tablet (25 mg) by oral route every 6 hours as needed for 30 days   Singulair 10 mg oral tablet 02/10/2020 take 1 tablet (10 mg) by oral route once daily in the evening for 30 days   theophylline 300 mg oral tablet extended release 12 hr 06/29/2020 Take 1 tablet by mouth three times a day   Xanax 1 mg oral tablet 06/09/2020 take 1 tablet (1 mg) by oral route 3 times per day for 30 days         Allergy List  Allergen Name Date Reaction Notes   Christy 2- rash --    Cipro --  nausea and yeast infection --    I.V. Dye 8- --  Had CT of Head   Keflex --  --  --    Terramycin --  red, swelling --        Allergies Reconciled  Family Medical History  Disease Name Relative/Age Notes   Respiratory Disorder  Respiratory failure   Heart Disease  --    Lung cancer  --          Social History  Finding Status Start/Stop Quantity Notes   Alcohol Never --/-- --  --    Denies illicit substance abuse --  --/-- --  --    Denies substance abuse --  --/-- --  --    Tobacco Current every day --/-- 9-12 cigs qd --          Immunizations  NameDate Admin Mfg Trade Name Lot Number Route Inj VIS Given VIS Publication   Gagflajkh29/14/2019 [facility] Fluzone Quadrivalent [id number] IM LD 10/14/2019    Comments:  "   Kemkhhxrs11/01/2018 [facility] Fluzone Quadrivalent [id number] IM LD 11/01/2018 08/07/2015   Comments:    Hmgjsahbk57/01/2017 UNK Unknown TradeName  IM UKN     Comments:    Prevnar 1311/02/2015 UNK Unknown TradeName  IM UKN     Comments:          Review of Systems  · Constitutional  o Admits  o : fatigue, weakness  o Denies  o : sick contacts, fever, chills  · Cardiovascular  o Denies  o : dypnea on exertion, pain in chest  · Respiratory  o Admits  o : cough, sputum  o Denies  o : shortness of breath  · Gastrointestinal  o Denies  o : diarrhea, constipation  · Genitourinary  o Denies  o : urgency, frequency  · Musculoskeletal  o Admits  o : muscle pain, back pain  · Psychiatric  o Admits  o : anxiety      Vitals  Date Time BP Position Site L\R Cuff Size HR RR TEMP (F) WT  HT  BMI kg/m2 BSA m2 O2 Sat        06/29/2020 04:44 PM 98/80 Sitting    77 - R 18 97.6 167lbs 2oz 5'  5\" 27.81 1.86 90 %          Physical Examination  · Constitutional  o Appearance  o : well-nourished, well developed, alert, in no acute distress, well-tended appearance  · Head and Face  o Head  o :   § Inspection  § : atraumatic, normocephalic  · Eyes  o Eyes  o : extraocular movements intact, no scleral icterus, no conjunctival injection  · Respiratory  o Respiratory Effort  o : breathing comfortably, symmetric chest rise  o Auscultation of Lungs  o : clear to asculatation bilaterally, no wheezes, rales, or rhonchii  · Cardiovascular  o Heart  o :   § Auscultation of Heart  § : regular rate and rhythm, no murmurs, rubs, or gallops  o Peripheral Vascular System  o :   § Carotid Arteries  § : normal pulses bilaterally, no bruits present  § Extremities  § : no edema  · Gastrointestinal  o Abdominal Examination  o :   § Abdomen  § : bowel sounds present, non-distended, non-tender  · Skin and Subcutaneous Tissue  o General Inspection  o : no lesions present, no areas of discoloration, skin turgor normal  · Neurologic  o Mental Status Examination  o : "   § Orientation  § : grossly oriented to person, place and time  o Gait and Station  o :   § Gait Screening  § : normal gait  · Psychiatric  o General  o : normal mood and affect          Assessment  · Need for hepatitis C screening test     V73.89/Z11.59  · Anxiety disorder     300.00/F41.9  · CHF (congestive heart failure)     428.0/I50.9  · COPD (chronic obstructive pulmonary disease)     496/J44.9  · Hyperlipidemia     272.4/E78.5  · Hypothyroidism     244.9/E03.9  · Nicotine dependence     305.1/F17.200  · Depression     296.31  · CKD (chronic kidney disease)     585.9/N18.9  · Hypotension     458.9/I95.9  · PAF (paroxysmal atrial fibrillation)     427.31/I48.0  · Medication management     V58.69/Z79.899    Problems Reconciled  Plan  · Orders  o Hepatitis C antibody MEDICARE screening [facility] (78513, ) - V73.89/Z11.59 - 06/29/2020  o Thyroid Ultrasound. (59690) - 244.9/E03.9 - 06/29/2020   [facility]  o Smoking cessation counseling, 3-10 minutes [facility] (75453) - 305.1/F17.200 - 06/29/2020  o ACO-17: Screened for tobacco use AND received tobacco cessation intervention (4004F) - 305.1/F17.200 - 06/29/2020  o Physical, Primary Care Panel (CBC, CMP, Lipid, TSH) [facility] (99869, 92704, 89505, 34915) - 244.9/E03.9, 272.4/E78.5 - 06/29/2020  o Thyroid Profile (10490, 19731, THYII) - 244.9/E03.9 - 06/29/2020  o ACO-39: Current medications updated and reviewed () - - 06/29/2020  o Theophylline level (99518) - 496/J44.9, V58.69/Z79.899 - 06/29/2020  o Digoxin level (59890) - 427.31/I48.0, V58.69/Z79.899 - 06/29/2020  o Drug Screen ([facility] send out to [facility] only) - 300.00/F41.9 - 06/29/2020  · Medications  o cetirizine 10 mg oral tablet   SIG: take 1 tablet (10 mg) by oral route once daily for 30 days   DISP: (30) tablets with 5 refills  Refilled on 06/29/2020     o cyanocobalamin (vitamin B-12) 1,000 mcg/mL injection solution   SIG: inject 1 milliliter (1,000 mcg) by intramuscular route once a month for 30 days   DISP: (1)  1 ml vial with 5 refills  Refilled on 06/29/2020     o Flovent  mcg/actuation inhalation HFA aerosol inhaler   SIG: inhale 2 puffs (220 mcg) by inhalation route 2 times per day for 30 days   DISP: (1) 12 gm aer w/adap with 5 refills  Refilled on 06/29/2020     o folic acid 1 mg oral tablet   SIG: Take 1 tablet by mouth once daily   DISP: (30) Tablet with 5 refills  Refilled on 06/29/2020     o levothyroxine 75 mcg oral tablet   SIG: Take 1 tablet by mouth once daily   DISP: (30) Tablet with 2 refills  Refilled on 06/29/2020     o potassium chloride 20 mEq oral tablet extended release   SIG: take 1 tablet (20 meq) by oral route once daily with food for 30 days   DISP: (30) tablet with 5 refills  Refilled on 06/29/2020     o pravastatin 80 mg oral tablet   SIG: take 1 tablet (80 mg) by oral route once daily at bedtime for 90 days   DISP: (90) tablet with 1 refills  Refilled on 06/29/2020     o ProAir HFA 90 mcg/actuation inhalation HFA aerosol inhaler   SIG: inhale 1 puff (90 mcg) by inhalation route every 6 hours as needed for 30 days   DISP: (1) 8.5 gm canister with 5 refills  Refilled on 06/29/2020     o theophylline 300 mg oral tablet extended release 12 hr   SIG: Take 1 tablet by mouth three times a day   DISP: (270) Tablet with 1 refills  Refilled on 06/29/2020     o Xanax 1 mg oral tablet   SIG: take 1 tablet (1 mg) by oral route 3 times per day for 30 days   DISP: (90) tablet with 0 refills  Refilled on 06/29/2020     o Mag 64 64 mg oral tablet,delayed release (DR/EC)   SIG: Take 1 tablet by mouth once daily   DISP: (30) Tablet with 5 refills  Discontinued on 06/29/2020     o Zofran 8 mg oral tablet   SIG: take 1 tablet by oral route 3 times a day for 15 days   DISP: (45) tablets with 0 refills  Discontinued on 06/29/2020     o Medications have been Reconciled  o Transition of Care or Provider Policy  · Instructions  o Medicare suggests a once in a lifetime screening for Hepatitis C for all Medicare  beneficiaries born between 3198-6925.  o *Form of nicotine being used: Cigarettes  o Patient was strongly encouraged to discontinue use of any nicotine containing product or minimize the use of the product.  o Discussed smoking cessation and counseling with patient for over 3 minutes.  o Patient is taking medications as prescribed and doing well.   o Patient was educated/instructed on their diagnosis, treatment and medications prior to discharge from the clinic today.  o Call the office with any concerns or questions.  o Chronic conditions reviewed and taken into consideration for today's treatment plan.  o Controlled substance agreement has been signed. Urine drug screen and Nathaniel reports have been reviewed and there are no discrepancies. Patient has denied side effect of medications. Patient advised to keep medications in original prescription bottle. Patient is to keep medications in a locked area away from children. Advised not to drive or operate heavy machinery when taking medications.  · Disposition  o Follow Up in 3 months     I will see Valerie back in 3 months for routine follow-up, advised patient that if she were to need something sooner to please follow-up sooner.    EMR dragon/transcription disclaimer: Much of this encounter note is an electronic transcription/translation of spoken language to printed text.  Electronic translation of spoken language may permit erroneous, or at times nonsensical words or phrases to be inadvertently transcribed; although I have reviewed the note for such errors, some may still exist.             Electronically Signed by: DENNY Gonzalez -Author on June 29, 2020 04:28:00 PM

## 2021-05-15 VITALS
DIASTOLIC BLOOD PRESSURE: 50 MMHG | WEIGHT: 151 LBS | HEART RATE: 102 BPM | BODY MASS INDEX: 25.16 KG/M2 | TEMPERATURE: 98.1 F | RESPIRATION RATE: 18 BRPM | OXYGEN SATURATION: 92 % | HEIGHT: 65 IN | SYSTOLIC BLOOD PRESSURE: 100 MMHG

## 2021-05-15 VITALS
RESPIRATION RATE: 16 BRPM | BODY MASS INDEX: 26.22 KG/M2 | HEART RATE: 108 BPM | HEIGHT: 65 IN | WEIGHT: 157.37 LBS | SYSTOLIC BLOOD PRESSURE: 89 MMHG | OXYGEN SATURATION: 93 % | TEMPERATURE: 97 F | DIASTOLIC BLOOD PRESSURE: 56 MMHG

## 2021-05-15 VITALS
BODY MASS INDEX: 27.84 KG/M2 | DIASTOLIC BLOOD PRESSURE: 80 MMHG | RESPIRATION RATE: 18 BRPM | WEIGHT: 167.12 LBS | TEMPERATURE: 97.6 F | HEART RATE: 77 BPM | OXYGEN SATURATION: 90 % | HEIGHT: 65 IN | SYSTOLIC BLOOD PRESSURE: 98 MMHG

## 2021-05-15 VITALS
BODY MASS INDEX: 24.18 KG/M2 | TEMPERATURE: 97.5 F | DIASTOLIC BLOOD PRESSURE: 70 MMHG | SYSTOLIC BLOOD PRESSURE: 100 MMHG | RESPIRATION RATE: 16 BRPM | HEART RATE: 123 BPM | WEIGHT: 145.12 LBS | HEIGHT: 65 IN | OXYGEN SATURATION: 91 %

## 2021-05-15 VITALS
DIASTOLIC BLOOD PRESSURE: 50 MMHG | SYSTOLIC BLOOD PRESSURE: 80 MMHG | TEMPERATURE: 97.2 F | BODY MASS INDEX: 24.99 KG/M2 | HEIGHT: 65 IN | OXYGEN SATURATION: 89 % | RESPIRATION RATE: 16 BRPM | HEART RATE: 89 BPM | WEIGHT: 150 LBS

## 2021-05-15 VITALS
TEMPERATURE: 97.5 F | HEART RATE: 101 BPM | HEIGHT: 65 IN | WEIGHT: 152.5 LBS | RESPIRATION RATE: 18 BRPM | SYSTOLIC BLOOD PRESSURE: 125 MMHG | OXYGEN SATURATION: 88 % | BODY MASS INDEX: 25.41 KG/M2 | DIASTOLIC BLOOD PRESSURE: 92 MMHG

## 2021-05-15 VITALS
SYSTOLIC BLOOD PRESSURE: 100 MMHG | BODY MASS INDEX: 26.03 KG/M2 | RESPIRATION RATE: 18 BRPM | HEIGHT: 65 IN | TEMPERATURE: 97.9 F | WEIGHT: 156.25 LBS | HEART RATE: 68 BPM | OXYGEN SATURATION: 93 % | DIASTOLIC BLOOD PRESSURE: 80 MMHG

## 2021-05-15 VITALS
SYSTOLIC BLOOD PRESSURE: 100 MMHG | OXYGEN SATURATION: 91 % | HEART RATE: 100 BPM | WEIGHT: 152 LBS | TEMPERATURE: 98.5 F | BODY MASS INDEX: 25.33 KG/M2 | RESPIRATION RATE: 18 BRPM | HEIGHT: 65 IN | DIASTOLIC BLOOD PRESSURE: 76 MMHG

## 2021-05-15 VITALS
BODY MASS INDEX: 25.49 KG/M2 | SYSTOLIC BLOOD PRESSURE: 94 MMHG | HEART RATE: 90 BPM | DIASTOLIC BLOOD PRESSURE: 43 MMHG | TEMPERATURE: 98 F | RESPIRATION RATE: 16 BRPM | WEIGHT: 153 LBS | OXYGEN SATURATION: 92 % | HEIGHT: 65 IN

## 2021-05-15 VITALS
TEMPERATURE: 98.6 F | SYSTOLIC BLOOD PRESSURE: 100 MMHG | HEIGHT: 65 IN | HEART RATE: 109 BPM | WEIGHT: 147.25 LBS | OXYGEN SATURATION: 93 % | DIASTOLIC BLOOD PRESSURE: 60 MMHG | BODY MASS INDEX: 24.53 KG/M2 | RESPIRATION RATE: 18 BRPM

## 2021-05-16 VITALS
HEART RATE: 94 BPM | DIASTOLIC BLOOD PRESSURE: 50 MMHG | SYSTOLIC BLOOD PRESSURE: 102 MMHG | OXYGEN SATURATION: 92 % | RESPIRATION RATE: 18 BRPM | BODY MASS INDEX: 27.16 KG/M2 | HEIGHT: 65 IN | TEMPERATURE: 97.4 F | WEIGHT: 163 LBS

## 2021-05-16 VITALS
OXYGEN SATURATION: 94 % | BODY MASS INDEX: 27.16 KG/M2 | HEART RATE: 103 BPM | WEIGHT: 163 LBS | HEIGHT: 65 IN | TEMPERATURE: 97.7 F | RESPIRATION RATE: 16 BRPM | SYSTOLIC BLOOD PRESSURE: 84 MMHG | DIASTOLIC BLOOD PRESSURE: 62 MMHG

## 2021-05-16 VITALS
WEIGHT: 168 LBS | HEIGHT: 65 IN | DIASTOLIC BLOOD PRESSURE: 62 MMHG | OXYGEN SATURATION: 92 % | SYSTOLIC BLOOD PRESSURE: 88 MMHG | TEMPERATURE: 97.9 F | BODY MASS INDEX: 27.99 KG/M2 | RESPIRATION RATE: 16 BRPM | HEART RATE: 100 BPM

## 2021-05-16 VITALS
TEMPERATURE: 97 F | DIASTOLIC BLOOD PRESSURE: 58 MMHG | OXYGEN SATURATION: 90 % | SYSTOLIC BLOOD PRESSURE: 88 MMHG | RESPIRATION RATE: 16 BRPM | BODY MASS INDEX: 27.66 KG/M2 | HEIGHT: 65 IN | WEIGHT: 166 LBS | HEART RATE: 93 BPM

## 2021-05-16 VITALS
SYSTOLIC BLOOD PRESSURE: 92 MMHG | HEIGHT: 65 IN | BODY MASS INDEX: 25.66 KG/M2 | DIASTOLIC BLOOD PRESSURE: 64 MMHG | WEIGHT: 154 LBS | RESPIRATION RATE: 20 BRPM | TEMPERATURE: 97.4 F | HEART RATE: 110 BPM | OXYGEN SATURATION: 95 %

## 2021-05-16 VITALS
TEMPERATURE: 97.6 F | DIASTOLIC BLOOD PRESSURE: 64 MMHG | HEART RATE: 73 BPM | SYSTOLIC BLOOD PRESSURE: 90 MMHG | WEIGHT: 167 LBS | BODY MASS INDEX: 27.82 KG/M2 | RESPIRATION RATE: 18 BRPM | OXYGEN SATURATION: 92 % | HEIGHT: 65 IN

## 2022-09-20 ENCOUNTER — NURSING HOME (OUTPATIENT)
Dept: INTERNAL MEDICINE | Facility: CLINIC | Age: 62
End: 2022-09-20

## 2022-09-20 DIAGNOSIS — N39.0 URINARY TRACT INFECTION WITHOUT HEMATURIA, SITE UNSPECIFIED: ICD-10-CM

## 2022-09-20 DIAGNOSIS — F41.1 ANXIETY, GENERALIZED: ICD-10-CM

## 2022-09-20 DIAGNOSIS — R13.12 OROPHARYNGEAL DYSPHAGIA: ICD-10-CM

## 2022-09-20 DIAGNOSIS — N17.9 AKI (ACUTE KIDNEY INJURY): ICD-10-CM

## 2022-09-20 DIAGNOSIS — J44.1 COPD WITH EXACERBATION: ICD-10-CM

## 2022-09-20 DIAGNOSIS — E11.9 TYPE 2 DIABETES MELLITUS WITHOUT COMPLICATION, WITHOUT LONG-TERM CURRENT USE OF INSULIN: ICD-10-CM

## 2022-09-20 DIAGNOSIS — E06.3 HYPOTHYROIDISM DUE TO HASHIMOTO'S THYROIDITIS: ICD-10-CM

## 2022-09-20 DIAGNOSIS — I48.11 LONGSTANDING PERSISTENT ATRIAL FIBRILLATION: ICD-10-CM

## 2022-09-20 DIAGNOSIS — G93.41 ACUTE METABOLIC ENCEPHALOPATHY: ICD-10-CM

## 2022-09-20 DIAGNOSIS — J96.22 ACUTE ON CHRONIC RESPIRATORY FAILURE WITH HYPOXIA AND HYPERCAPNIA: ICD-10-CM

## 2022-09-20 DIAGNOSIS — E03.8 HYPOTHYROIDISM DUE TO HASHIMOTO'S THYROIDITIS: ICD-10-CM

## 2022-09-20 DIAGNOSIS — I25.10 CORONARY ARTERY DISEASE INVOLVING NATIVE CORONARY ARTERY OF NATIVE HEART WITHOUT ANGINA PECTORIS: Primary | ICD-10-CM

## 2022-09-20 DIAGNOSIS — F33.1 MAJOR DEPRESSIVE DISORDER, RECURRENT, MODERATE: ICD-10-CM

## 2022-09-20 DIAGNOSIS — J96.21 ACUTE ON CHRONIC RESPIRATORY FAILURE WITH HYPOXIA AND HYPERCAPNIA: ICD-10-CM

## 2022-09-20 PROCEDURE — 99306 1ST NF CARE HIGH MDM 50: CPT | Performed by: INTERNAL MEDICINE

## 2022-09-20 NOTE — PROGRESS NOTES
Nursing Home History and Physical Note      Clyde Berkowitz MD  [x]  [address], [address]  [place] [address]  Phone: [phone]  Fax: [phone]     PATIENT NAME: Valerie Valente                                                                          YOB: 1960            DATE OF SERVICE: 2022  FACILITY:   [] [facility]   [x] [facility]  [] [facility]  [] Other      HISTORY OF PRESENT ILLNESS: Patient is a pleasant 62-year-old female who is not a very good historian but she is very talkative alert, says she came from [facility], she says she is here because of a feeding tube, she is not able to tell me why she has the feeding tube, she says she is weak and needs therapy, she was living at home with her daughter, her  is ,    PAST MEDICAL & SURGICAL HISTORY:     Dysphagia, currently on honey thick liquid type diet only for now with speech therapy consultation  Diabetes type 2  Dementia  Vitamin B12 deficiency  COPD with current and recent tobacco abuse  dePression, anxiety currently on fluoxetine, trazodone, olanzapine, hypertension  Gastroesophageal reflux  Hyperlipidemia  Insomnia  MEDICATIONS:  I have reviewed and reconciled the patients medication list in the patients chart at the skilled nursing facility today.      ALLERGIES:  Allergies are listed to cephalexin, tetracycline and contrast dye     SOCIAL HISTORY:  Social History     Socioeconomic History   • Marital status:    She quit smoking couple months ago, does not drink alcohol lives with her daughter at home, does not work currently,    FAMILY HISTORY:  Family history reviewed with patient but not contributory to this admission      PHYSICAL EXAMINATION:   VITAL SIGNS: 93/60, 123/77, temperature 97.8, sat 98% on room air, pulse of 83  , Weight is 145.5 pounds with a BMI of 25.7  PHYSICAL EXAM: Patient has  scarring to her face, missing part of her right ear, the scarring is irregular on both sides of the face, forehead, she is alert pleasant talkative, throat shows dry mucosa lungs are clear anteriorly and laterally cardiac exam regular rhythm without murmur gallop or rub her G-tube sites intact her abdomen slightly obese soft nontender, there is no redness or drainage around the G-tube site or lower extremities show no edema she has a 1-2+ DP pulse on the right foot, 1+ DP pulse on the left, she can move her feet to command she bends her knees to command she raises her arms up and down she has some ecchymosis on the forearms bilaterally, and hands, she can turn sideways into the bed, pleasant and answering simple questions    RECORDS REVIEW:   Orders Reviewed.  Labs Reviewed.      ICD-10-CM ICD-9-CM   1. Coronary artery disease involving native coronary artery of native heart without angina pectoris  I25.10 414.01   2. Acute metabolic encephalopathy  G93.41 348.31   3. Acute on chronic respiratory failure with hypoxia and hypercapnia ([facility])  J96.21 518.84    J96.22 786.09     799.02   4. Hypothyroidism due to Hashimoto's thyroiditis  E03.8 244.8    E06.3 245.2   5. Type 2 diabetes mellitus without complication, without long-term current use of insulin ([facility])  E11.9 250.00   6. Longstanding persistent atrial fibrillation ([facility])  I48.11 427.31   7. VALDEZ (acute kidney injury) ([facility])  N17.9 584.9   8. COPD with exacerbation ([facility])  J44.1 491.21   9. Urinary tract infection without hematuria, site unspecified  N39.0 599.0   10. Oropharyngeal dysphagia  R13.12 787.22   11. Anxiety, generalized  F41.1 300.02   12. Major depressive disorder, recurrent, moderate ([facility])  F33.1 296.32       ASSESSMENT/PLAN    Diagnoses and all orders for this visit:    1. Coronary artery disease involving native coronary artery of native heart without angina pectoris (Primary)    2. Acute metabolic encephalopathy    3. Acute on chronic respiratory  failure with hypoxia and hypercapnia (HCC)    4. Hypothyroidism due to Hashimoto's thyroiditis    5. Type 2 diabetes mellitus without complication, without long-term current use of insulin (HCC)    6. Longstanding persistent atrial fibrillation (HCC)    7. VALDEZ (acute kidney injury) (HCC)    8. COPD with exacerbation (HCC)    9. Urinary tract infection without hematuria, site unspecified    10. Oropharyngeal dysphagia    11. Anxiety, generalized    12. Major depressive disorder, recurrent, moderate (HCC)       Acute hypercarbic hypoxic respiratory failure secondary to COPD exacerbation admission to outside facility, August 28 discharge date September 19, 2022 to our facility, she was treated with antibiotics and steroids at the outside facility a CT of the head was negative for stroke,  Bradycardic episode in the hospital resolved  Chest pains on prior admissions to outside facility requiring airflight to [facility] lung with respiratory failure and altered mental status  Acute metabolic encephalopathy and hypoxic respiratory failure on admission to outside facility  Dysphagia, PEG tube placement, currently on honey thick liquid diet only, will follow with speech therapy evaluations and consultation,  Diabetes continues on metformin 500 mg twice a day  Depression anxiety continues on olanzapine, trazodone, fluoxetine  Hypertension continues on metoprolol  COPD, heavy tobacco use in the past previous treatments in the past for COPD exacerbations, continues on Dulera inhaler twice a day, as needed albuterol inhaler  Chronic hypotension was on midodrine at home prior to admission  Acute kidney injury, hypernatremia and dehydration at outside facility treated at the outside facility with antibiotics,  Urinary tract infection  Anemia with B12 deficiency, severe at 108, currently being replaced  Atrial fibrillation chronic on anticoagulation with Eliquis to continue  Hypothyroidism  Non-ST elevation type II  myocardial infarction, outside echo showed ejection fraction of 62%,  Thrombocytopenia  Clyde Berkowitz MD

## 2022-09-21 ENCOUNTER — NURSING HOME (OUTPATIENT)
Dept: INTERNAL MEDICINE | Facility: CLINIC | Age: 62
End: 2022-09-21

## 2022-09-21 DIAGNOSIS — J96.21 ACUTE ON CHRONIC RESPIRATORY FAILURE WITH HYPOXIA AND HYPERCAPNIA: ICD-10-CM

## 2022-09-21 DIAGNOSIS — G93.41 ACUTE METABOLIC ENCEPHALOPATHY: ICD-10-CM

## 2022-09-21 DIAGNOSIS — R13.12 OROPHARYNGEAL DYSPHAGIA: Primary | ICD-10-CM

## 2022-09-21 DIAGNOSIS — F41.1 ANXIETY, GENERALIZED: ICD-10-CM

## 2022-09-21 DIAGNOSIS — I25.10 CORONARY ARTERY DISEASE INVOLVING NATIVE CORONARY ARTERY OF NATIVE HEART WITHOUT ANGINA PECTORIS: ICD-10-CM

## 2022-09-21 DIAGNOSIS — E03.8 HYPOTHYROIDISM DUE TO HASHIMOTO'S THYROIDITIS: ICD-10-CM

## 2022-09-21 DIAGNOSIS — E11.9 TYPE 2 DIABETES MELLITUS WITHOUT COMPLICATION, WITHOUT LONG-TERM CURRENT USE OF INSULIN: ICD-10-CM

## 2022-09-21 DIAGNOSIS — I48.11 LONGSTANDING PERSISTENT ATRIAL FIBRILLATION: ICD-10-CM

## 2022-09-21 DIAGNOSIS — E06.3 HYPOTHYROIDISM DUE TO HASHIMOTO'S THYROIDITIS: ICD-10-CM

## 2022-09-21 DIAGNOSIS — J44.1 COPD WITH EXACERBATION: ICD-10-CM

## 2022-09-21 DIAGNOSIS — F33.1 MAJOR DEPRESSIVE DISORDER, RECURRENT, MODERATE: ICD-10-CM

## 2022-09-21 DIAGNOSIS — J96.22 ACUTE ON CHRONIC RESPIRATORY FAILURE WITH HYPOXIA AND HYPERCAPNIA: ICD-10-CM

## 2022-09-21 PROCEDURE — 99308 SBSQ NF CARE LOW MDM 20: CPT | Performed by: INTERNAL MEDICINE

## 2022-09-21 NOTE — PROGRESS NOTES
Nursing Home Follow-Up Note      Clyde Berkowitz MD  [x]  [address], [address]  [place] [address]  Phone: [phone]  Fax: [phone]     PATIENT NAME: Valerie Valente                                                                          YOB: 1960            DATE OF SERVICE: 09/21/2022  FACILITY:   [] [facility]   [x] [facility]  [] [facility]  [] Other      HISTORY OF PRESENT ILLNESS: Patient is being seen for follow-up with abnormal labs that came back last night, she is in good spirits no complaints no new issues, she says her legs are numb, she is in no distress      PAST MEDICAL & SURGICAL HISTORY:   Dysphagia, currently on honey thick liquid type diet only for now with speech therapy consultation  Diabetes type 2  Dementia  Vitamin B12 deficiency  COPD with current and recent tobacco abuse  dePression, anxiety currently on fluoxetine, trazodone, olanzapine, hypertension  Gastroesophageal reflux  Hyperlipidemia  Insomnia      MEDICATIONS:  I have reviewed and reconciled the patients medication list in the patients chart at the skilled nursing facility today.        PHYSICAL EXAMINATION:   VITAL SIGNS: 112/63 temperature 97.6, sat 97% on room air, pulse rate is 68    PHYSICAL EXAM:, Lower extremities showed no edema she was moving all 4 extremities to my command her abdomen was soft her lungs were clear anteriorly cardiac exam regular rhythm, she was pleasant awake alert lying in bed flat without any respiratory compromise      RECORDS REVIEW:   Orders Reviewed.  Labs Reviewed.      ICD-10-CM ICD-9-CM   1. Oropharyngeal dysphagia  R13.12 787.22   2. Hypothyroidism due to Hashimoto's thyroiditis  E03.8 244.8    E06.3 245.2   3. Acute on chronic respiratory failure with hypoxia and hypercapnia (HCC)  J96.21 518.84    J96.22 786.09     799.02   4. Acute metabolic encephalopathy  G93.41 348.31   5.  Longstanding persistent atrial fibrillation (HCC)  I48.11 427.31   6. Type 2 diabetes mellitus without complication, without long-term current use of insulin (HCC)  E11.9 250.00   7. COPD with exacerbation (HCC)  J44.1 491.21   8. Anxiety, generalized  F41.1 300.02   9. Major depressive disorder, recurrent, moderate (HCC)  F33.1 296.32   10. Coronary artery disease involving native coronary artery of native heart without angina pectoris  I25.10 414.01       ASSESSMENT/PLAN    Diagnoses and all orders for this visit:    1. Oropharyngeal dysphagia (Primary)    2. Hypothyroidism due to Hashimoto's thyroiditis    3. Acute on chronic respiratory failure with hypoxia and hypercapnia (HCC)    4. Acute metabolic encephalopathy    5. Longstanding persistent atrial fibrillation (HCC)    6. Type 2 diabetes mellitus without complication, without long-term current use of insulin (HCC)    7. COPD with exacerbation (HCC)    8. Anxiety, generalized    9. Major depressive disorder, recurrent, moderate (HCC)    10. Coronary artery disease involving native coronary artery of native heart without angina pectoris        Acute hypercarbic hypoxic respiratory failure secondary to COPD exacerbation admission to outside facility, August 28, 2022 discharge date September 19, 2022 to our facility, she was treated with antibiotics and steroids at the outside facility a CT of the head was negative for stroke,    Bradycardic episode in the hospital resolved    Chest pains on prior admissions to outside facility requiring airflight to [facility]  with respiratory failure and altered mental status    Acute metabolic encephalopathy and hypoxic respiratory failure on admission to outside facility    Dysphagia, PEG tube placement, currently on honey thick liquid diet only, will follow with speech therapy evaluations and consultation,    Diabetes, hemoglobin A1c at 4.9, low decrease metformin to once a day dosing for now, September 21, 2022      Depression anxiety continues on olanzapine, trazodone, fluoxetine    Hypertension continues on metoprolol    COPD, heavy tobacco use in the past previous treatments in the past for COPD exacerbations, continues on Dulera inhaler twice a day, as needed albuterol inhaler    Chronic hypotension was on midodrine at home prior to admission    Acute kidney injury, hypernatremia and dehydration at outside facility     Urinary tract infection    Anemia with B12 deficiency, severe at 108, currently being replaced, current level improved at 277 September 20, 2022,    Atrial fibrillation chronic on anticoagulation with Eliquis to continue    Hypothyroidism, currently over replaced on current lab studies will need to adjust medications, September 21, 2022    Non-ST elevation type II myocardial infarction, outside echo showed ejection fraction of 62%,    Thrombocytopenia, currently resolved on lab work September 20, 2022      Clyde Berkowitz MD

## 2023-04-03 ENCOUNTER — TELEPHONE (OUTPATIENT)
Dept: GASTROENTEROLOGY | Facility: CLINIC | Age: 63
End: 2023-04-03
Payer: MEDICARE

## 2023-04-03 NOTE — TELEPHONE ENCOUNTER
Received an incoming call from Madeleine Castillo with [facility]. She stated they had a patient they need to be scheduled with our office due to possible pancreatic cancer. I advised her of our next available of 7/20/23. She stated she needed to be seen sooner. I asked that she send over any information, imaging, labs, etc over for the APRN to review.